# Patient Record
Sex: MALE | Race: WHITE
[De-identification: names, ages, dates, MRNs, and addresses within clinical notes are randomized per-mention and may not be internally consistent; named-entity substitution may affect disease eponyms.]

---

## 2017-01-04 ENCOUNTER — HOSPITAL ENCOUNTER (OUTPATIENT)
Dept: HOSPITAL 45 - C.OPENMRI | Age: 62
Discharge: HOME | End: 2017-01-04
Attending: ORTHOPAEDIC SURGERY
Payer: COMMERCIAL

## 2017-01-04 DIAGNOSIS — M54.16: Primary | ICD-10-CM

## 2017-01-04 DIAGNOSIS — Z96.651: ICD-10-CM

## 2017-01-04 DIAGNOSIS — M48.06: ICD-10-CM

## 2017-01-04 DIAGNOSIS — M51.26: ICD-10-CM

## 2017-01-04 NOTE — DIAGNOSTIC IMAGING REPORT
LUMBAR SPINE MRI



HISTORY:      LUMBAGO, LUMBAR RADICULOPATHY, RT TKA

Right



TECHNIQUE: Multiplanar multisequence MRI of the lumbar spine was performed

without the use of contrast.



COMPARISON:  None.



FINDINGS: For the purpose of the report the L5-S1 disc space will be located on

axial image 27 of 30.

Straightening of the lumbar spine. The alignment is intact. No fracture or

subluxation. The visualized retroperitoneal soft tissues are unremarkable.

Moderate disc space narrowing at L3-L4. Severe disc space narrowing at L5-S1.

Mild disc space narrowing at L1-L2, L2-L3, and L4-L5. The conus terminates at

the T12-L1 disc space level.



L1-L2: No significant central canal or neural foraminal narrowing.



L2-L3: Small broad-based posterior disc bulge without significant central canal

or neural foraminal narrowing.



L3-L4: Small broad-based posterior disc bulge resulting in mild central canal

narrowing. No significant neural foraminal narrowing.



L4-L5: There is a 13 x 9 mm right paracentral focal disc protrusion resulting in

mild to moderate right-sided central canal narrowing. This compresses the

transiting right L5 nerve root. This also results in mild right-sided neural

foraminal narrowing.



L5-S1: No significant central canal or neural foraminal narrowing.



IMPRESSION:  



1. A 13 x 9 mm right paracentral focal disc protrusion resulting in mild to

moderate right-sided central canal narrowing. This compresses the transiting

right L5 nerve root.

2. Straightening of the lumbar spine.

3. Additional degenerative changes as described above 







Electronically signed by:  Lorenzo Small M.D.

1/4/2017 10:05 AM

## 2017-05-11 ENCOUNTER — HOSPITAL ENCOUNTER (EMERGENCY)
Dept: HOSPITAL 45 - C.EDC | Age: 62
Discharge: HOME | End: 2017-05-11
Payer: COMMERCIAL

## 2017-05-11 VITALS
BODY MASS INDEX: 21.5 KG/M2 | HEIGHT: 72.01 IN | WEIGHT: 158.73 LBS | HEIGHT: 72.01 IN | BODY MASS INDEX: 21.5 KG/M2 | WEIGHT: 158.73 LBS

## 2017-05-11 VITALS — HEART RATE: 106 BPM | DIASTOLIC BLOOD PRESSURE: 91 MMHG | OXYGEN SATURATION: 95 % | SYSTOLIC BLOOD PRESSURE: 131 MMHG

## 2017-05-11 VITALS — TEMPERATURE: 99.32 F

## 2017-05-11 DIAGNOSIS — M51.26: Primary | ICD-10-CM

## 2017-05-11 DIAGNOSIS — Z79.899: ICD-10-CM

## 2017-05-11 DIAGNOSIS — Z98.1: ICD-10-CM

## 2017-05-11 DIAGNOSIS — Z88.5: ICD-10-CM

## 2017-05-11 DIAGNOSIS — J44.9: ICD-10-CM

## 2017-05-11 DIAGNOSIS — F17.200: ICD-10-CM

## 2017-05-11 DIAGNOSIS — F31.9: ICD-10-CM

## 2017-05-11 DIAGNOSIS — Z85.51: ICD-10-CM

## 2017-05-11 DIAGNOSIS — Z90.49: ICD-10-CM

## 2017-05-11 LAB
ANION GAP SERPL CALC-SCNC: 6 MMOL/L (ref 3–11)
APPEARANCE UR: CLEAR
BASOPHILS # BLD: 0.02 K/UL (ref 0–0.2)
BASOPHILS NFR BLD: 0.3 %
BILIRUB UR-MCNC: (no result) MG/DL
BUN SERPL-MCNC: 20 MG/DL (ref 7–18)
BUN/CREAT SERPL: 23.2 (ref 10–20)
CALCIUM SERPL-MCNC: 9.3 MG/DL (ref 8.5–10.1)
CHLORIDE SERPL-SCNC: 110 MMOL/L (ref 98–107)
CO2 SERPL-SCNC: 27 MMOL/L (ref 21–32)
COLOR UR: (no result)
COMPLETE: YES
CREAT CL PREDICTED SERPL C-G-VRATE: 88.5 ML/MIN
CREAT SERPL-MCNC: 0.87 MG/DL (ref 0.6–1.4)
EOSINOPHIL NFR BLD AUTO: 220 K/UL (ref 130–400)
GLUCOSE SERPL-MCNC: 79 MG/DL (ref 70–99)
HCT VFR BLD CALC: 47.7 % (ref 42–52)
IG%: 0.3 %
IMM GRANULOCYTES NFR BLD AUTO: 34 %
LYMPHOCYTES # BLD: 2.41 K/UL (ref 1.2–3.4)
MANUAL MICROSCOPIC REQUIRED?: NO
MCH RBC QN AUTO: 31.3 PG (ref 25–34)
MCHC RBC AUTO-ENTMCNC: 33.8 G/DL (ref 32–36)
MCV RBC AUTO: 92.6 FL (ref 80–100)
MONOCYTES NFR BLD: 8.3 %
NEUTROPHILS # BLD AUTO: 1.1 %
NEUTROPHILS NFR BLD AUTO: 56 %
NITRITE UR QL STRIP: (no result)
PH UR STRIP: 5 [PH] (ref 4.5–7.5)
PMV BLD AUTO: 9.5 FL (ref 7.4–10.4)
POTASSIUM SERPL-SCNC: 3.9 MMOL/L (ref 3.5–5.1)
RBC # BLD AUTO: 5.15 M/UL (ref 4.7–6.1)
REVIEW REQ?: NO
SODIUM SERPL-SCNC: 143 MMOL/L (ref 136–145)
SP GR UR STRIP: 1.03 (ref 1–1.03)
URINE BILL WITH OR WITHOUT MIC: (no result)
UROBILINOGEN UR-MCNC: (no result) MG/DL
WBC # BLD AUTO: 7.09 K/UL (ref 4.8–10.8)
ZZUR CULT IF INDIC CLEAN CATCH: NO

## 2017-05-11 NOTE — DIAGNOSTIC IMAGING REPORT
LUMBAR SPINE MRI



HISTORY: MRI lumbar spine  Lumbar pain into R, unable to ambulate, fecal

incontinence



TECHNIQUE: Multiplanar multisequence MRI of the lumbar spine was performed

without the use of contrast.



COMPARISON:  None.



FINDINGS: For the purpose of the report the L5-S1 disc space will be located on

axial image 23 of 25.

Moderate to rather significant degenerative this changes throughout. Posterior

disc herniation L4-L5 base on sagittal images.



L1-L2: No significant central canal or neural foraminal narrowing.



L2-L3: Minimal disc bulge. No significant impact with thecal sac.



L3-L4: Mild broad-based disc bulge. Minimal impact anterior thecal sac.



L4-L5: Right posterior disc herniation. This displaces the thecal sac to the

left and considerably narrows the origin of the right neuroforamina. Maximum

dimension the extruded disc is 12 x 10 mm.



L5-S1: No significant central canal or neural foraminal narrowing.



IMPRESSION:  



1. Significant right posterior disc herniation L4-L5

2. This creates narrowing of the right neuroforamina with a significant impact

upon the right lateral aspect of the thecal sac.

3. Several slight broad-based disc bulges.





4. Considerable degenerative disc changes throughout 







Electronically signed by:  Espinoza Fish M.D.

5/11/2017 4:38 PM



Dictated Date/Time:  5/11/2017 4:34 PM

## 2017-05-11 NOTE — EMERGENCY ROOM VISIT NOTE
ED Visit Note


First contact with patient:  13:41


I have personally evaluated this patient examined her and reviewed the 

pertinent labs and data. I have discussed the case with Marquez Amanda,The 

physician assistant and agree with the plan. Please refer to the PA note





This patient comes in with severe back pain.  He feels better after receiving 

IV pain medications.  We did an MRI and he does have a posterior disc bulge 

with right foraminal narrowing.  On exam, it does hurt when I move his leg.  He 

has nothing to suggest cauda equina syndrome.  We have discussed the case with 

Dr. Martinez, who feels that he follow-up with the patient the office on Monday.

  The patient feels better and does desire to go home.  The patient will be 

discharged home with pain medications.

## 2017-05-11 NOTE — EMERGENCY ROOM VISIT NOTE
History


First contact with patient:  13:41


Chief Complaint:  BACK PAIN


Stated Complaint:  BACK PAIN





History of Present Illness


The patient is a 61 year old male who presents to the Emergency Room via 

ambulance with complaints of "back pain".  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 

the coal mines, and has a history of spinal problems.  He states that he had a 

laminectomy of the cervical spine, had a knee replacement in July 2017.  This 

was performed by Dr. Mckeon.  He notes that roughly 2 months ago, he started 

with low back pain radiated into his right leg.  He states that he had an MRI 

performed at that time, and was referred to an orthopedic surgeon but is unsure 

who.  He has not seen them at this time.  He notes that since the MRI even 

doing well up until 3 days ago, when the pain has worsened 29/10 down his right 

leg.  He states that he has an extreme difficulty with cannulating, and is 

unable to bear weight secondary to pain on the right leg.  There is been no 

trauma or recent injury.  He denies any numbness or tingling in genital region, 

but does note near fecal incontinence, as well as trouble urinating.  He does 

have a history of bladder cancer 2.  He denies any fevers, chills, chest pain, 

shortness of breath, nausea, vomiting, urinary symptoms other than inability to 

void for blood in the stool or urine.  He said that compared to the pain he had 

when he had the lumbar spine MRI this is 10 times worse.  He was able to 

ambulate from his house to the ambulate, he had to be escorted on a stretcher.





Review of Systems


A complete 10-point Review of Systems was discussed with the patient, with 

pertinent positives and negatives listed in the History of Present Illness. All 

remaining Review of Systems questions can be considered negative unless 

otherwise specified.





Past Medical/Surgical History


Medical Problems:


(1) Bipolar I disorder


(2) Chronic obstructive lung disease


(3) Decompression/fusion cervical spine


(4) Decompression/fusion lumbar spine


(5) History of bladder neoplasm


(6) History of cholecystectomy








Social History


Smoking Status:  Current Every Day Smoker


Alcohol Use:  none


Drug Use:  none


Marital Status:  


Housing Status:  lives alone


Occupation Status:  employed





Current/Historical Medications


Scheduled


Finasteride (Proscar), 5 MG PO QPM


Methylprednisolone (Medrol Dosepak), 0 PO DAILY


Quetiapine Fumarate (Quetiapine Fumarate), 800 MG PO HS





Scheduled PRN


Albuterol Hfa (Ventolin Hfa), 2-4 PUFFS INH Q6H PRN for SOB/Wheezing


Methocarbamol (Robaxin), 1,000 MG PO QID PRN for Pain


Oxycodone Ir (Roxicodone Ir), 1-2 TAB PO Q4H PRN for Pain





Allergies


Coded Allergies:  


     Oxycodone (Verified  Adverse Reaction, Intermediate, NAUSEA/VOMITING, 10/12

/15)





Physical Exam


Vital Signs











  Date Time  Temp Pulse Resp B/P Pulse Ox O2 Delivery O2 Flow Rate FiO2


 


5/11/17 20:23  106 20 131/91 95   


 


5/11/17 19:45  101 20 128/79 95 Room Air  


 


5/11/17 17:00  82 20 126/83 94 Room Air  


 


5/11/17 15:45  79 20 118/74 95 Room Air  


 


5/11/17 13:23 37.4 80 20 125/77 95 Room Air  











Physical Exam


VITAL SIGNS - Vital signs and nursing notes were reviewed.  Patient is afebrile

, normotensive at 125/77, non-tachycardic and is saturating well on room air 95%

.


GENERAL -61-year-old male appearing his stated age who is in no acute distress 

but is writhing in pain, . Communicates well with provider and answers 

questions appropriately.


SKIN - Without rashes.


HEAD - NC/AT.


EYES -Sclera anicteric. Palpebral conjunctiva pink and moist with no injection 

noted.


EARS - No deformities of external structures noted on gross examination 

bilaterally. 


NOSE - Midline and without cyanosis. No epistaxis or purulent drainage noted. 


MOUTH/OROPHARYNX - Without perioral cyanosis. Buccal mucosa pink and moist and 

without leukoplakia.


NECK - Neck with FROM. Supple to palpation.  No meningismus.


LUNGS - Chest wall symmetric without accessory muscle use, intercostals 

retractions, or central cyanosis. Normal vesicular breath sounds CTA B/L. No 

wheezes, rales, or rhonchi appreciated.


CARDIAC - RRR with S1/S2. No murmur, rubs, or gallops appreciated. 


ABDOMEN - Abdominal contour  without pulsations or visible masses. BS 

normoactive all four quadrants. No tenderness, palpable masses, 

hepatosplenomegaly, or ascites noted.


EXTREMITIES - No clubbing or peripheral cyanosis. No pretibial edema present.  

He is vascularly intact in the lower extremities.  He does have exquisite 

tenderness upon range of motion of the right lower extremity.  There is 

tenderness of the lumbar spinous processes radiating to the rectal region and 

down the right leg.


NEUROLOGIC - Cranial nerves II through XII grossly intact.


PSYCH - Pt is very pleasant and interacts well with examiner.





Medical Decision & Procedures


ER Provider


Diagnostic Interpretation:


LUMBAR SPINE MRI





HISTORY: MRI lumbar spine  Lumbar pain into R, unable to ambulate, fecal


incontinence





TECHNIQUE: Multiplanar multisequence MRI of the lumbar spine was performed


without the use of contrast.





COMPARISON:  None.





FINDINGS: For the purpose of the report the L5-S1 disc space will be located on


axial image 23 of 25.


Moderate to rather significant degenerative this changes throughout. Posterior


disc herniation L4-L5 base on sagittal images.





L1-L2: No significant central canal or neural foraminal narrowing.





L2-L3: Minimal disc bulge. No significant impact with thecal sac.





L3-L4: Mild broad-based disc bulge. Minimal impact anterior thecal sac.





L4-L5: Right posterior disc herniation. This displaces the thecal sac to the


left and considerably narrows the origin of the right neuroforamina. Maximum


dimension the extruded disc is 12 x 10 mm.





L5-S1: No significant central canal or neural foraminal narrowing.





IMPRESSION:  





1. Significant right posterior disc herniation L4-L5


2. This creates narrowing of the right neuroforamina with a significant impact


upon the right lateral aspect of the thecal sac.


3. Several slight broad-based disc bulges.








4. Considerable degenerative disc changes throughout 











Electronically signed by:  Espinoza Fish M.D.


5/11/2017 4:38 PM





Dictated Date/Time:  5/11/2017 4:34 PM





Laboratory Results


5/11/17 14:16








Red Blood Count 5.15, Mean Corpuscular Volume 92.6, Mean Corpuscular Hemoglobin 

31.3, Mean Corpuscular Hemoglobin Concent 33.8, Mean Platelet Volume 9.5, 

Neutrophils (%) (Auto) 56.0, Lymphocytes (%) (Auto) 34.0, Monocytes (%) (Auto) 

8.3, Eosinophils (%) (Auto) 1.1, Basophils (%) (Auto) 0.3, Neutrophils # (Auto) 

3.97, Lymphocytes # (Auto) 2.41, Monocytes # (Auto) 0.59, Eosinophils # (Auto) 

0.08, Basophils # (Auto) 0.02





5/11/17 14:16

















Test


  5/11/17


14:16 5/11/17


18:24


 


White Blood Count


  7.09 K/uL


(4.8-10.8) 


 


 


Red Blood Count


  5.15 M/uL


(4.7-6.1) 


 


 


Hemoglobin


  16.1 g/dL


(14.0-18.0) 


 


 


Hematocrit 47.7 % (42-52)  


 


Mean Corpuscular Volume


  92.6 fL


() 


 


 


Mean Corpuscular Hemoglobin


  31.3 pg


(25-34) 


 


 


Mean Corpuscular Hemoglobin


Concent 33.8 g/dl


(32-36) 


 


 


Platelet Count


  220 K/uL


(130-400) 


 


 


Mean Platelet Volume


  9.5 fL


(7.4-10.4) 


 


 


Neutrophils (%) (Auto) 56.0 %  


 


Lymphocytes (%) (Auto) 34.0 %  


 


Monocytes (%) (Auto) 8.3 %  


 


Eosinophils (%) (Auto) 1.1 %  


 


Basophils (%) (Auto) 0.3 %  


 


Neutrophils # (Auto)


  3.97 K/uL


(1.4-6.5) 


 


 


Lymphocytes # (Auto)


  2.41 K/uL


(1.2-3.4) 


 


 


Monocytes # (Auto)


  0.59 K/uL


(0.11-0.59) 


 


 


Eosinophils # (Auto)


  0.08 K/uL


(0-0.5) 


 


 


Basophils # (Auto)


  0.02 K/uL


(0-0.2) 


 


 


RDW Standard Deviation


  42.5 fL


(36.4-46.3) 


 


 


RDW Coefficient of Variation


  12.6 %


(11.5-14.5) 


 


 


Immature Granulocyte % (Auto) 0.3 %  


 


Immature Granulocyte # (Auto)


  0.02 K/uL


(0.00-0.02) 


 


 


Anion Gap


  6.0 mmol/L


(3-11) 


 


 


Est Creatinine Clear Calc


Drug Dose 88.5 ml/min 


  


 


 


Estimated GFR (


American) 106.5 


  


 


 


Estimated GFR (Non-


American 91.9 


  


 


 


BUN/Creatinine Ratio 23.2 (10-20)  


 


Calcium Level


  9.3 mg/dl


(8.5-10.1) 


 


 


Urine Color  DK YELLOW 


 


Urine Appearance  CLEAR (CLEAR) 


 


Urine pH  5.0 (4.5-7.5) 


 


Urine Specific Gravity


  


  1.033


(1.000-1.030)


 


Urine Protein  NEG (NEG) 


 


Urine Glucose (UA)  NEG (NEG) 


 


Urine Ketones  1+ (NEG) 


 


Urine Occult Blood  NEG (NEG) 


 


Urine Nitrite  NEG (NEG) 


 


Urine Bilirubin  NEG (NEG) 


 


Urine Urobilinogen  NEG (NEG) 


 


Urine Leukocyte Esterase  NEG (NEG) 











Medications Administered











 Medications


  (Trade)  Dose


 Ordered  Sig/Clarice


 Route  Start Time


 Stop Time Status Last Admin


Dose Admin


 


 Morphine Sulfate


  (MoRPHine


 SULFATE INJ)  4 mg  NOW  STAT


 IV  5/11/17 13:57


 5/11/17 14:00 DC 5/11/17 14:26


4 MG


 


 Ondansetron HCl


  (Zofran Inj)  4 mg  NOW  STAT


 IV  5/11/17 13:57


 5/11/17 14:00 DC 5/11/17 14:25


4 MG


 


 Ketorolac


 Tromethamine


  (Toradol Inj)  30 mg  NOW  STAT


 IV  5/11/17 13:57


 5/11/17 14:00 DC 5/11/17 14:26


30 MG


 


 Dexamethasone


 Sodium Phosphate


  (Decadron Inj)  10 mg  NOW  STAT


 IV  5/11/17 13:57


 5/11/17 14:00 DC 5/11/17 14:25


10 MG


 


 Lorazepam


  (Ativan Inj)  1 mg  NOW  STAT


 IV  5/11/17 15:46


 5/11/17 15:47 DC 5/11/17 15:55


1 MG


 


 Morphine Sulfate


  (MoRPHine


 SULFATE INJ)  4 mg  NOW  STAT


 IV  5/11/17 17:15


 5/11/17 17:17 DC 5/11/17 17:51


4 MG


 


 Oxycodone HCl


  (Roxicodone


 Immediate Rel 5MG


 Home Pack)  1 homepack  UD  STAT


 PO  5/11/17 19:54


 5/11/17 19:55 DC 5/11/17 20:00


1 HOMEPACK











Medical Decision


Patient was seen and evaluated as above.  After obtaining a thorough history 

and physical examination IV access was previously established in route via ALS, 

he is provided 100 g of fentanyl.  He notes this help with the pain slightly.  

Basic labs were drawn, and at this time because of the patient's worsening 

symptoms, and near fecal incontinence, to the point where the patient states 

that he is unable to control his bowels I do believe that a repeat MRI is 

warranted.  Patient is not able to ambulate and presents via ambulance.  For 

pain he was provided with 4 mg of morphine, 4 mg of Zofran, 30 mg of Toradol, 

10 mg of Decadron all intravenously for his symptoms today.  He is reevaluated 

and was feeling better.  I informed him that because of his subjective and 

objective examination findings of the believe MRI is warranted of the lumbar 

spine.  UA clean catch was also ordered.  He was premedicated with 1 mg of 

Ativan intravenously prior to MRI as he notes that he had an open MRI before.  

He was given 4 mg of morphine and noted that he was feeling better and was 

actually able to ambulate.  That around 5:40 PM I spoke with Dr. Phillips, of 

[facility], and we discussed the MRI results and the patient's 

case.  He    informed me that he would be happy to see the patient, at 845 AM 

on Monday in his office.  This was at the patient was able to be discharged, 

and if so he was to be given a Medrol Dosepak at home.  If he was unable to be 

discharged secondary to his pain I was to call him back.  I then talk with the 

patient about options of staying versus going home and the patient then had an 

ambulatory trial, and past and decided he would like to go home.  I do believe 

this is reasonable.  The patient was provided with a small prescription for 

OxyIR as well as a home pack and a Medrol Dosepak.  He was given the phone 

number to call Dr. Phillips's office to schedule follow-up.  He was educated 

upon management, was educated upon worrisome symptoms which to return, had 

questions prior to discharge and was ambulatory from the emergency Department 

without the need for assistance in good condition.  He did have a ride and did 

not drive home personally.





In the evaluation treatment this patient following differential diagnoses were 

entertained: Herniated disc, renal calculi, lumbar strain, among others.





[place] Drug Monitoring Program


Search Results:  patient reviewed within database, no issues identified





Impression





 Primary Impression:  


 Significant right posterior disc herniation L4-L5





Departure Information


Dispostion


Home / Self-Care





Condition


GOOD





Prescriptions





Oxycodone Ir (Roxicodone Ir) 5 Mg Tab


1-2 TAB PO Q4H Y for Pain, #21 TAB


   For Initial Treatment


   Prov: Marquez Amanda, PAOmeroC         5/11/17 


Methylprednisolone (MEDROL DOSEPAK) 4 Mg Earl


0 PO DAILY, #1 PKT


   Prov: VasiliyMarquez PA-C         5/11/17





Referrals


Shadia Duncan DO (PCP)








Pritesh Phillips M.D.





Patient Instructions


My [facility]





Additional Instructions





You have been treated in the Emergency Department for Back Pain. You have 

received pain medicine in the emergency department which impairs your ability 

to operate a vehicle. It is illegal for you to drive after receiving these 

medicines. 





You have been prescribed OXY IR to be used for pain control. This is a narcotic 

medication. You cannot drive or consume alcohol while on this medicine. This 

medicine should only be used for pain that cannot be controlled with over-the-

counter pain medicines.





You have been prescribed a Medrol Dosepak. Take this medication as prescribed. 

You should take the COMPLETE 6-day course of this medication. This is an anti-

inflammatory medicine that will help to minimize your symptoms.





For pain control, you can use the following over-the-counter medicines (if >13 yo):





- Regular strength (325mg/tab) Tylenol (acetaminophen) 2 tabs every 4-6 hours 

as needed. Do not exceed 12 tablets in a 24 hour period. Avoid taking more than 

3 grams (3000 mg) of Tylenol per day. This includes any other sources of 

acetaminophen you may take on a regular basis.





- Regular strength (200 mg/tab) Advil (ibuprofen) 1-2 tabs every 4-6 hours as 

needed. Do not exceed a dose of 3200 mg per day.





If this is an acute injury, ice can be applied to the area of pain for the 

first 3 days to help decrease pain and inflammation. After the first 3 days, a 

heating pad can be used over the area for continued soothing relief.





Your case was discussed with Dr. Phillips from [facility].  His 

office numbers listed above.  Please call him first thing tomorrow morning to 

address your appointment that Dr. Phillips would like to see you Monday morning 

at 845AM.  He personally told me that he would like to see you at that time.





Return to the Emergency Department if your current symptoms worsen despite 

treatment course outlined above, or if you develop any of the following symptoms

: intractable pain despite aforementioned treatment course, loss of control of 

your bowel or bladder, numbness or tingling in your groin, or development of a 

fever.





Please return to the emergency department with any new/concerning symptoms.

## 2017-05-24 ENCOUNTER — HOSPITAL ENCOUNTER (INPATIENT)
Dept: HOSPITAL 45 - C.EDA | Age: 62
LOS: 3 days | Discharge: HOME | DRG: 552 | End: 2017-05-27
Attending: HOSPITALIST | Admitting: HOSPITALIST
Payer: COMMERCIAL

## 2017-05-24 VITALS
BODY MASS INDEX: 21.62 KG/M2 | BODY MASS INDEX: 21.62 KG/M2 | HEIGHT: 72 IN | HEIGHT: 72 IN | WEIGHT: 159.61 LBS | WEIGHT: 159.61 LBS

## 2017-05-24 DIAGNOSIS — M51.16: Primary | ICD-10-CM

## 2017-05-24 DIAGNOSIS — Z51.81: ICD-10-CM

## 2017-05-24 DIAGNOSIS — F31.9: ICD-10-CM

## 2017-05-24 DIAGNOSIS — Z80.6: ICD-10-CM

## 2017-05-24 DIAGNOSIS — Z85.51: ICD-10-CM

## 2017-05-24 DIAGNOSIS — Z91.19: ICD-10-CM

## 2017-05-24 DIAGNOSIS — N40.0: ICD-10-CM

## 2017-05-24 DIAGNOSIS — J44.9: ICD-10-CM

## 2017-05-24 DIAGNOSIS — Z98.1: ICD-10-CM

## 2017-05-24 DIAGNOSIS — R45.1: ICD-10-CM

## 2017-05-24 DIAGNOSIS — Z81.8: ICD-10-CM

## 2017-05-24 DIAGNOSIS — Z79.899: ICD-10-CM

## 2017-05-24 DIAGNOSIS — M47.26: ICD-10-CM

## 2017-05-24 DIAGNOSIS — Z80.1: ICD-10-CM

## 2017-05-24 DIAGNOSIS — F17.210: ICD-10-CM

## 2017-05-24 LAB
ALBUMIN/GLOB SERPL: 1.1 {RATIO} (ref 0.9–2)
ALP SERPL-CCNC: 81 U/L (ref 45–117)
ALT SERPL-CCNC: 24 U/L (ref 12–78)
ANION GAP SERPL CALC-SCNC: 8 MMOL/L (ref 3–11)
AST SERPL-CCNC: 15 U/L (ref 15–37)
BUN SERPL-MCNC: 16 MG/DL (ref 7–18)
BUN/CREAT SERPL: 17.5 (ref 10–20)
CALCIUM SERPL-MCNC: 8.5 MG/DL (ref 8.5–10.1)
CHLORIDE SERPL-SCNC: 111 MMOL/L (ref 98–107)
CO2 SERPL-SCNC: 24 MMOL/L (ref 21–32)
CREAT CL PREDICTED SERPL C-G-VRATE: 83.3 ML/MIN
CREAT SERPL-MCNC: 0.93 MG/DL (ref 0.6–1.4)
EOSINOPHIL NFR BLD AUTO: 241 K/UL (ref 130–400)
GLOBULIN SER-MCNC: 3.2 GM/DL (ref 2.5–4)
GLUCOSE SERPL-MCNC: 90 MG/DL (ref 70–99)
HCT VFR BLD CALC: 43.7 % (ref 42–52)
MCH RBC QN AUTO: 31.6 PG (ref 25–34)
MCHC RBC AUTO-ENTMCNC: 34.1 G/DL (ref 32–36)
MCV RBC AUTO: 92.6 FL (ref 80–100)
PMV BLD AUTO: 9.9 FL (ref 7.4–10.4)
POTASSIUM SERPL-SCNC: 3.7 MMOL/L (ref 3.5–5.1)
RBC # BLD AUTO: 4.72 M/UL (ref 4.7–6.1)
SODIUM SERPL-SCNC: 143 MMOL/L (ref 136–145)
TSH SERPL-ACNC: 2.02 UIU/ML (ref 0.3–4.5)
WBC # BLD AUTO: 10.86 K/UL (ref 4.8–10.8)

## 2017-05-24 NOTE — HISTORY AND PHYSICAL
History & Physical


Date & Time of Service:


May 24, 2017 at 23:12


Chief Complaint:


Rt Leg & Back Pain


Primary Care Physician:


Shadia Duncan DO


History of Present Illness


Source:  patient


60 y/o M Hx Chronic back pain, COPD, Bipolar disease.  Pt has had severe lower 

back pain for several weeks.  He had visited his orthopedist and was scheduled 

for an appt following an MRI which he failed to attend.  The MRI completed on  revealed a significant L4-5 herniation.  He describes severe persistent pain

, RLE numbness and weakness and difficulty ambulating as a result.  


The pt threatened self harm if he is discharged from the ER stating to the ER 

attending that he would take all his Seroquel if he is sent home.  In 

retrospect he states that he was not literally wanting to hurt himself but 

simply wished to make clear that he could not tolerate the degree of pain.


The case was discussed with orthopedics who advised on admission for pain 

control and AM evaluation.





Past Medical/Surgical History


Medical Problems:


(1) Bipolar I disorder


Status: Chronic  





(2) Chronic obstructive lung disease


Status: Chronic  





(3) Decompression/fusion cervical spine


Status: Resolved  





(4) Decompression/fusion lumbar spine


Status: Resolved  





(5) History of bladder neoplasm


Status: Resolved  





(6) History of cholecystectomy


Status: Resolved  











Family History


Mother  owing to lung CA


Father  owing to leukemia





Social History


Smoking Status:  Current Every Day Smoker


Drug Use:  none


Marital Status:  


Housing status:  lives alone


Occupational Status:  employed





Immunizations


History of Influenza Vaccine:  Unknown


History of Tetanus Vaccine?:  Yes


Tetanus Immunization Date:  2007


History of Pneumococcal:  No


History of Hepatitis B Vaccine:  No





Allergies


Coded Allergies:  


     Oxycodone (Verified  Adverse Reaction, Intermediate, NAUSEA/VOMITING, )





Home Medications


Scheduled


Finasteride (Proscar), 5 MG PO QPM


Quetiapine Fumarate (Quetiapine Fumarate), 800 MG PO HS





Scheduled PRN


Albuterol Hfa (Ventolin Hfa), 2-4 PUFFS INH Q6H PRN for SOB/Wheezing





Review of Systems


Constitutional:  No chills, No fever, No sweats


Eyes:  No eye pain, No worsening of vision


ENT:  No hearing loss, No nasal symptoms, No unusual epistaxis


Respiratory:  No cough, No sputum, No wheezing


Cardiovascular:  No PND, No chest pain, No orthopnea


Abdomen:  No nausea, No pain, No vomiting


Musculoskeletal:  + joint pain, + muscle pain


Genitourinary - Male:  No dysuria, No hematuria


Neurologic:  + numbness/tingling, + paralysis, No memory loss, No weakness


Psychiatric:  + anhedonism, + anxiety, + depression symptoms, + problem reported

 (Suicidal ideation)


Endocrine:  No fatigue


Hematologic / Lymphatic:  No abnormal bleeding/bruising


Integumentary:  No rash


Allergic / Immunologic:  No environmental allergies





Physical Exam


Vital Signs











  Date Time  Temp Pulse Resp B/P Pulse Ox O2 Delivery O2 Flow Rate FiO2


 


17 22:25  79 14  96   


 


17 22:20  73 23  94   


 


17 22:15  76 13  95   


 


17 22:11  80      


 


17 22:10  78 25  97   


 


17 22:08    137/90    


 


17 21:06 37.3 80 18 120/64 96 Room Air  








General Appearance:  WD/WN, no apparent distress


Head:  normocephalic


Eyes:  normal inspection, PERRL, EOMI


ENT:  normal ENT inspection, pharynx normal


Neck:  supple, no JVD


Respiratory/Chest:  chest non-tender, no respiratory distress, no accessory 

muscle use, + wheezing


Cardiovascular:  regular rate, rhythm, no edema, no gallop, no JVD, no murmur, 

normal peripheral pulses


Abdomen/GI:  normal bowel sounds, non tender, soft


Extremities/Musculoskelatal:  normal inspection, no calf tenderness, normal 

capillary refill, no pedal edema


Neurologic/Psych:  CNs II-XII nml as tested, oriented x 3, + pertinent finding (

His RLE is numb distally and strength is reduced distally as compared to L - 

ROM is limited due to pain)


Skin:  normal color, warm/dry, no rash





Diagnostics


Laboratory Results





Results Past 24 Hours








Test


  17


22:10 17


22:25 Range/Units


 


 


White Blood Count 10.86  4.8-10.8  K/uL


 


Red Blood Count 4.72  4.7-6.1  M/uL


 


Hemoglobin 14.9  14.0-18.0  g/dL


 


Hematocrit 43.7  42-52  %


 


Mean Corpuscular Volume 92.6    fL


 


Mean Corpuscular Hemoglobin 31.6  25-34  pg


 


Mean Corpuscular Hemoglobin


Concent 34.1


  


  32-36  g/dl


 


 


RDW Standard Deviation 43.3  36.4-46.3  fL


 


RDW Coefficient of Variation 12.6  11.5-14.5  %


 


Platelet Count 241  130-400  K/uL


 


Mean Platelet Volume 9.9  7.4-10.4  fL


 


Sodium Level 143  136-145  mmol/L


 


Potassium Level 3.7  3.5-5.1  mmol/L


 


Chloride Level 111    mmol/L


 


Carbon Dioxide Level 24  21-32  mmol/L


 


Anion Gap 8.0  3-11  mmol/L


 


Blood Urea Nitrogen 16  7-18  mg/dl


 


Creatinine


  0.93


  


  0.60-1.40


mg/dl


 


Est Creatinine Clear Calc


Drug Dose 83.3


  


   ml/min


 


 


Estimated GFR (


American) 100.9


  


   


 


 


Estimated GFR (Non-


American 87.1


  


   


 


 


BUN/Creatinine Ratio 17.5  10-20  


 


Random Glucose 90  70-99  mg/dl


 


Calcium Level 8.5  8.5-10.1  mg/dl


 


Total Bilirubin 0.4  0.2-1  mg/dl


 


Aspartate Amino Transf


(AST/SGOT) 15


  


  15-37  U/L


 


 


Alanine Aminotransferase


(ALT/SGPT) 24


  


  12-78  U/L


 


 


Alkaline Phosphatase 81    U/L


 


Total Protein 6.7  6.4-8.2  gm/dl


 


Albumin 3.5  3.4-5.0  gm/dl


 


Globulin 3.2  2.5-4.0  gm/dl


 


Albumin/Globulin Ratio 1.1  0.9-2  


 


Thyroid Stimulating Hormone


(TSH) 2.020


  


  0.300-4.500


uIu/ml


 


Chemistry Specimen Hemolysis    


 


Ethyl Alcohol mg/dL  < 3.0 0-3  mg/dl











Diagnostic Radiology


Lumbar MRI


1. Significant right posterior disc herniation L4-L5


2. This creates narrowing of the right neuroforamina with a significant impact 

upon the right lateral aspect of the thecal sac.


3. Several slight broad-based disc bulges.


4. Considerable degenerative disc changes throughout





Impression


Assessment and Plan


60 y/o M Hx Chronic back pain, COPD, Bipolar disease.  Pt has had severe lower 

back pain for several weeks.  He had visited his orthopedist and was scheduled 

for an appt following an MRI which he failed to attend.  The MRI completed on  revealed a significant L4-5 herniation.  He describes severe persistent pain

, RLE numbness and weakness and difficulty ambulating as a result.  


The pt threatened self harm if he is discharged from the ER stating to the ER 

attending that he would take all his Seroquel if he is sent home.  In 

retrospect he states that he was not literally wanting to hurt himself but 

simply wished to make clear that he could not tolerate the degree of pain.


The case was discussed with orthopedics who advised on admission for pain 

control and AM evaluation.





1) Back pain - rest, IVF, pain control pending evaluation by orthopedics - may 

need intervention





2) Bipolar disease - suicidal threats - denies this was said seriously - does 

not appear particularly depressed and is calm and cooperative - would consider 

psych consult if he shows any signs of depression - cont Seroquel as scheduled





3) COPD - some wheezing is present on exam - will cont his albuterol - received 

a dose of Decadron in ER for his back pain.





4) Smoking cessation advice provided - he is down to 2 cigarettes daily.











Full code - Heparin prophylaxis 


Total time for this admit including review of labs, meds, imaging, records - 

discussion with pt and ER attending - 34 min





Level of Care


Med/Surg





Resuscitation Status


FULL RESUSCITATION





VTE Prophylaxis


Given or contraindicated:  Unfractionated heparin SQ

## 2017-05-24 NOTE — EMERGENCY ROOM VISIT NOTE
History


Report prepared by Eliud:  Timothy Ross


Under the Supervision of:  Dr. Gregory Child M.D.


First contact with patient:  21:32


Chief Complaint:  BACK PAIN


Stated Complaint:  RT LEG & BACK PAIN





History of Present Illness


The patient is a 61 year old male who presents to the Emergency Room via 

ambulance with complaints of constant low back pain for the past five months. 

The patient states that he additionally has pain in his right leg and both legs 

while standing. He does not have a fever, and he states that he has not eaten 

today. The patient states that he cannot live with this pain anymore, and 

stated that he is going to take all of his Seroquel. The patient states that he 

was supposed to have back surgery five days ago, however he could not get a 

ride to get blood work so it was cancelled. The patient states that he is not 

taking anything for the pain, and he ran out of Vicodin. He additionally states 

that he had bladder cancer twice, and he has COPD. The patient states that he 

has tried prednisone in the past, though it does not help with the pain.





   Source of History:  patient


   Onset:  five months ago


   Position:  back (lower)


   Timing:  constant


   Modifying Factors (Worsening):  other (standing)


   Associated Symptoms:  No fevers


Note:


Associated symptoms: Leg pain





Review of Systems


See HPI for pertinent positives & negatives. A total of 10 systems reviewed and 

were otherwise negative.





Past Medical & Surgical


Medical Problems:


(1) Back pain


(2) Bipolar I disorder


(3) Chronic obstructive lung disease


(4) Decompression/fusion cervical spine


(5) Decompression/fusion lumbar spine


(6) Herniation of intervertebral disc between L4 and L5


(7) History of bladder neoplasm


(8) History of cholecystectomy








Social History


Smoking Status:  Current Every Day Smoker


Alcohol Use:  none


Drug Use:  none


Marital Status:  


Housing Status:  lives alone


Occupation Status:  employed





Current/Historical Medications


Scheduled


Finasteride (Proscar), 5 MG PO QPM


Quetiapine Fumarate (Quetiapine Fumarate), 800 MG PO HS





Scheduled PRN


Albuterol Hfa (Ventolin Hfa), 2-4 PUFFS INH Q6H PRN for SOB/Wheezing





Allergies


Coded Allergies:  


     Oxycodone (Verified  Adverse Reaction, Intermediate, NAUSEA/VOMITING, 5/24/ 17)





Physical Exam


Vital Signs











  Date Time  Temp Pulse Resp B/P Pulse Ox O2 Delivery O2 Flow Rate FiO2


 


5/24/17 22:25  79 14  96   


 


5/24/17 22:20  73 23  94   


 


5/24/17 22:15  76 13  95   


 


5/24/17 22:11  80      


 


5/24/17 22:10  78 25  97   


 


5/24/17 22:08    137/90    


 


5/24/17 21:06 37.3 80 18 120/64 96 Room Air  











Physical Exam


GENERAL: Patient is in moderate distress distress secondary to pain.


HEENT: No acute trauma, normocephalic atraumatic, mucous membranes dry, no 

nasal congestion, no scleral icterus.


NECK: No stridor, no adenopathy, no meningismus, trachea is midline.


LUNGS: Wheezing bilaterally, breath sounds equal, no respiratory distress.


HEART: Without murmurs gallops or rubs, regular rate and rhythm.


ABDOMEN: Soft, nontender, bowel sounds positive, no hernias, no peritonitis.


EXTREMITIES: Wasting of the right thigh musculature when compared to the left. 

No cyanosis or edema, full range of motion of all the joints without pain or 

difficulty, no signs for acute trauma.


NEUROLOGIC: 3/4 left patellar reflex. 0/4 right patellar reflex. Oriented x 3. 

Awake


SKIN: No rash, no jaundice, no diaphoresis.





Medical Decision & Procedures


Laboratory Results


5/24/17 22:10








5/24/17 22:10

















Test


  5/24/17


22:10 5/24/17


22:25


 


Red Blood Count


  4.72 M/uL


(4.7-6.1) 


 


 


Mean Corpuscular Volume


  92.6 fL


() 


 


 


Mean Corpuscular Hemoglobin


  31.6 pg


(25-34) 


 


 


Mean Corpuscular Hemoglobin


Concent 34.1 g/dl


(32-36) 


 


 


RDW Standard Deviation


  43.3 fL


(36.4-46.3) 


 


 


RDW Coefficient of Variation


  12.6 %


(11.5-14.5) 


 


 


Mean Platelet Volume


  9.9 fL


(7.4-10.4) 


 


 


Anion Gap


  8.0 mmol/L


(3-11) 


 


 


Est Creatinine Clear Calc


Drug Dose 83.3 ml/min 


  


 


 


Estimated GFR (


American) 100.9 


  


 


 


Estimated GFR (Non-


American 87.1 


  


 


 


BUN/Creatinine Ratio 17.5 (10-20)  


 


Calcium Level


  8.5 mg/dl


(8.5-10.1) 


 


 


Total Bilirubin


  0.4 mg/dl


(0.2-1) 


 


 


Aspartate Amino Transf


(AST/SGOT) 15 U/L (15-37) 


  


 


 


Alanine Aminotransferase


(ALT/SGPT) 24 U/L (12-78) 


  


 


 


Alkaline Phosphatase


  81 U/L


() 


 


 


Total Protein


  6.7 gm/dl


(6.4-8.2) 


 


 


Albumin


  3.5 gm/dl


(3.4-5.0) 


 


 


Globulin


  3.2 gm/dl


(2.5-4.0) 


 


 


Albumin/Globulin Ratio 1.1 (0.9-2)  


 


Thyroid Stimulating Hormone


(TSH) 2.020 uIu/ml


(0.300-4.500) 


 


 


Chemistry Specimen Hemolysis   


 


Ethyl Alcohol mg/dL


  


  < 3.0 mg/dl


(0-3)








Laboratory results reviewed by me.





Medications Administered











 Medications


  (Trade)  Dose


 Ordered  Sig/Clarice


 Route  Start Time


 Stop Time Status Last Admin


Dose Admin


 


 Hydromorphone HCl


  (Dilaudid Inj)  1 mg  Q30M  PRN


 IV  5/24/17 21:45


 5/25/17 01:09 DC 5/24/17 22:00


1 MG


 


 Ketorolac


 Tromethamine


  (Toradol Inj)  30 mg  NOW  STAT


 IV  5/24/17 21:36


 5/24/17 21:40 DC 5/24/17 22:00


30 MG


 


 Ondansetron HCl 4


 mg  4 mg  NOW  STAT


 IV  5/24/17 21:36


 5/24/17 21:40 DC 5/24/17 21:59


4 MG


 


 Sodium Chloride  500 ml @ 


 999 mls/hr  Q31M STAT


 IV  5/24/17 21:36


 5/24/17 22:06 DC 5/24/17 21:36


999 MLS/HR


 


 Sodium Chloride


  (Nss 1000ml)  1,000 ml @ 


 200 mls/hr  Q5H STAT


 IV  5/24/17 21:36


 5/25/17 01:09 DC 5/24/17 21:36


200 MLS/HR


 


 Dexamethasone


 Sodium Phosphate


  (Decadron Inj)  10 mg  NOW  ONCE


 IV  5/24/17 21:45


 5/24/17 21:46 DC 5/24/17 21:59


10 MG











ED Course


2123: The patient was evaluated in room A11. A complete history and physical 

exam was performed.





2136: Sodium Chloride 1000 ml @ 200 mls/hr IV, Sodium Chloride 500 ml @ 999 mls/

hr IV, Zofran Inj 4mg IV, Toradol Inj 30mg IV





2145: Decadron Inj 10mg IV, Dilaudid Inj 1mg IV





2201: I discussed the patient's case with Yakov Luu PA-C, Orthopedic 

surgery, and he states that no physician will take the patient for admission 

because he is currently the only one on call.





2210: I discussed the patient's case with [facility]. He is going to evaluate the patient for further treatment





Medical Decision


The patient is a 61 year old male who presents to the ED with complaints of 

lower back pain.  Differential diagnoses considered include lumbar disc 

herniation, failed outpatient treatment, suicidality, dehydration, electrolyte 

imbalance.





There is a very mild leukocytosis, likely consistent with his pain, no anemia.  

No significant electrolyte abnormality, kidney failure or hepatitis.





I was able to review the MRI result from earlier this month, the patient does 

have a large right sided lumbar disc herniation which I do think explains his 

pain and symptoms.  On exam, he has no reflex of the right patella, he has 

wasting of his right quadriceps muscle.





The patient received IV Toradol, IV Decadron, IV Zofran and IV Dilaudid.  He is 

more comfortable.





Admission/observation is warranted.  The patient does appear to require 

surgical intervention.  His pain is not controllable at home.  I spoke to 

spinal surgery, I spoke to the on-call hospitalist.  Case management has been 

involved.





Consults


Time Called:  2157


Consulting Physician:  Yakov Luu PA-C, Orthopedic Surgery


Returned Call:  2201


I discussed the patient's case with Yakov Luu PA-C, Orthopedic surgery, 

and he states that no physician will take him for admission because he is the 

only one on call.


Additional Consults:  


   Time Called:  2207


   Consulted Physician:  [facility]


   Returned Call:  2210


Additional Comments:


I discussed the patient's case with [facility]. 

He is going to evaluate the patient for further treatment





Impression





 Primary Impression:  


 Herniation of right side of L4-L5 intervertebral disc


 Additional Impression:  


 Failure of outpatient treatment





Scribe Attestation


The scribe's documentation has been prepared under my direction and personally 

reviewed by me in its entirety. I confirm that the note above accurately 

reflects all work, treatment, procedures, and medical decision making performed 

by me.





Departure Information


Dispostion


Being Evaluated By Hospitalist





Referrals


Shadia Duncan DO (PCP)





Patient Instructions


My [facility]





Problem Qualifiers

## 2017-05-25 VITALS
SYSTOLIC BLOOD PRESSURE: 133 MMHG | TEMPERATURE: 98.24 F | OXYGEN SATURATION: 95 % | HEART RATE: 81 BPM | DIASTOLIC BLOOD PRESSURE: 83 MMHG

## 2017-05-25 VITALS
DIASTOLIC BLOOD PRESSURE: 74 MMHG | OXYGEN SATURATION: 94 % | TEMPERATURE: 98.24 F | HEART RATE: 84 BPM | SYSTOLIC BLOOD PRESSURE: 121 MMHG

## 2017-05-25 VITALS
DIASTOLIC BLOOD PRESSURE: 79 MMHG | TEMPERATURE: 97.88 F | OXYGEN SATURATION: 94 % | SYSTOLIC BLOOD PRESSURE: 116 MMHG | HEART RATE: 83 BPM

## 2017-05-25 VITALS
SYSTOLIC BLOOD PRESSURE: 142 MMHG | HEART RATE: 77 BPM | DIASTOLIC BLOOD PRESSURE: 76 MMHG | OXYGEN SATURATION: 96 % | TEMPERATURE: 98.42 F

## 2017-05-25 LAB
INR PPP: 1 (ref 0.9–1.1)
PROTHROMBIN TIME: 10.4 SECONDS (ref 9–12)

## 2017-05-25 RX ADMIN — HYDROMORPHONE HYDROCHLORIDE PRN MG: 2 INJECTION, SOLUTION INTRAMUSCULAR; INTRAVENOUS; SUBCUTANEOUS at 16:24

## 2017-05-25 RX ADMIN — GABAPENTIN SCH MG: 300 CAPSULE ORAL at 21:28

## 2017-05-25 RX ADMIN — HYDROMORPHONE HYDROCHLORIDE PRN MG: 1 INJECTION, SOLUTION INTRAMUSCULAR; INTRAVENOUS; SUBCUTANEOUS at 07:43

## 2017-05-25 RX ADMIN — DEXAMETHASONE SODIUM PHOSPHATE SCH MLS/MIN: 4 INJECTION, SOLUTION INTRA-ARTICULAR; INTRALESIONAL; INTRAMUSCULAR; INTRAVENOUS; SOFT TISSUE at 22:27

## 2017-05-25 RX ADMIN — FINASTERIDE SCH MG: 5 TABLET, FILM COATED ORAL at 21:28

## 2017-05-25 RX ADMIN — QUETIAPINE SCH MG: 200 TABLET, FILM COATED ORAL at 21:28

## 2017-05-25 RX ADMIN — QUETIAPINE SCH MG: 200 TABLET, FILM COATED ORAL at 01:21

## 2017-05-25 RX ADMIN — GABAPENTIN SCH MG: 300 CAPSULE ORAL at 11:10

## 2017-05-25 RX ADMIN — NICOTINE POLACRILEX PRN PIECE: 2 GUM, CHEWING ORAL at 12:51

## 2017-05-25 RX ADMIN — DEXAMETHASONE SODIUM PHOSPHATE SCH MLS/MIN: 4 INJECTION, SOLUTION INTRA-ARTICULAR; INTRALESIONAL; INTRAMUSCULAR; INTRAVENOUS; SOFT TISSUE at 11:10

## 2017-05-25 RX ADMIN — HYDROMORPHONE HYDROCHLORIDE PRN MG: 1 INJECTION, SOLUTION INTRAMUSCULAR; INTRAVENOUS; SUBCUTANEOUS at 00:39

## 2017-05-25 RX ADMIN — DEXAMETHASONE SODIUM PHOSPHATE SCH MLS/MIN: 4 INJECTION, SOLUTION INTRA-ARTICULAR; INTRALESIONAL; INTRAMUSCULAR; INTRAVENOUS; SOFT TISSUE at 16:26

## 2017-05-25 RX ADMIN — HYDROMORPHONE HYDROCHLORIDE PRN MG: 2 INJECTION, SOLUTION INTRAMUSCULAR; INTRAVENOUS; SUBCUTANEOUS at 19:26

## 2017-05-25 RX ADMIN — HYDROMORPHONE HYDROCHLORIDE PRN MG: 2 INJECTION, SOLUTION INTRAMUSCULAR; INTRAVENOUS; SUBCUTANEOUS at 22:28

## 2017-05-25 RX ADMIN — HYDROMORPHONE HYDROCHLORIDE PRN MG: 1 INJECTION, SOLUTION INTRAMUSCULAR; INTRAVENOUS; SUBCUTANEOUS at 12:15

## 2017-05-25 NOTE — PAIN MANAGEMENT CONSULTATION
Pain Management Consultation


Date of Consultation


May 25, 2017.





Social / Work History


Marital Status:  


Housing Status:  lives alone


Occupation:  employed





Allergies


Coded Allergies:  


     Oxycodone (Verified  Adverse Reaction, Intermediate, NAUSEA/VOMITING, 5/24/ 17)





Medications





 Current Inpatient Medications








 Medications


  (Trade)  Dose


 Ordered  Sig/Clarice


 Route  Start Time


 Stop Time Status Last Admin


Dose Admin


 


 Albuterol


  (Ventolin Hfa


 Inhaler)  2 puffs  Q6H  PRN


 INH  5/24/17 23:30


 6/23/17 23:29   


 


 


 Finasteride


  (Proscar Tab)  5 mg  QPM


 PO  5/25/17 21:00


 6/24/17 20:59   


 


 


 Quetiapine


 Fumarate


  (seroQUEL TAB)  800 mg  HS


 PO  5/25/17 01:30


 6/24/17 01:29  5/25/17 01:21


800 MG


 


 Heparin Sodium


  (Porcine)


  (Heparin Sq 5000


 Unit/0.5ml)  5,000 unit  0000,0800,1600


 SQ  5/25/17 09:55


 6/24/17 09:54 Future Hold  


 


 


 Acetaminophen


  (Tylenol Tab)  650 mg  Q4H  PRN


 PO  5/24/17 23:30


 6/23/17 23:29   


 


 


 Al Hydrox/Mg


 Hydrox/Simethicone


  (Maalox Max Susp)  15 ml  Q4H  PRN


 PO  5/24/17 23:30


 6/23/17 23:29   


 


 


 Magnesium


 Hydroxide


  (Milk Of


 Magnesia Susp)  30 ml  Q6H  PRN


 PO  5/24/17 23:30


 6/23/17 23:29   


 


 


 Polyethylene


  (Miralax Powder


 Packet)  17 gm  DAILY  PRN


 PO  5/24/17 23:30


 6/23/17 23:29   


 


 


 Ondansetron HCl


  (Zofran Inj)  4 mg  Q6H  PRN


 IV  5/24/17 23:30


 6/23/17 23:29   


 


 


 Gabapentin 300 mg  300 mg  BID


 PO  5/25/17 10:30


 6/24/17 10:29  5/25/17 11:10


300 MG


 


 Dexamethasone


 Sodium Phosphate/


 Syringe


  (Decadron Inj/


 Syringe)  1 ml @ 1


 mls/min  Q6H


 IV  5/25/17 11:00


 6/24/17 10:59  5/25/17 16:26


1 MLS/MIN


 


 Pantoprazole


 Sodium


  (Protonix Tab)  40 mg  QAM


 PO  5/26/17 09:00


 6/25/17 08:59   


 


 


 Nicotine


 Polacrilex


  (Nicorette 2MG


 Gum)  1 piece  Q1H  PRN


 MT  5/25/17 10:45


 6/24/17 10:44  5/25/17 12:51


1 PIECE


 


 Hydromorphone HCl


  (Dilaudid Inj)  1.5 mg  Q3H  PRN


 IV  5/25/17 16:00


 6/8/17 15:59  5/25/17 16:24


1.5 MG











Physical Exam


Height & Weight:


Height  6 feet, 0.00 inches.    


Weight 72.400 (Kilograms) 159 (Pounds)





Last Vital Signs Documentation








  Date Time  Temp Pulse Resp B/P Pulse Ox O2 Delivery O2 Flow Rate FiO2


 


5/25/17 15:36 36.9 77 18 142/76 96 Room Air  











Laboratory


Laboratory Results (Last CBC):


5/24/17 22:10











Assessment


1.  Lumbar radiculitis


2.  []


3.  []


4.  []


5.  []





Recommendations


1.  Scheduled for lumbar transforaminal injection tomorrow


2.  []


3.  []


4.  []


5.  []





Dragon Voice Recognition


This chart was completed in part utilizing Dragon Dictation Voice Recognition 

Software. Random word insertions, pronoun errors, and incomplete sentences are 

an occasional consequence of this system due to software limitations and 

ambient noise. Any questions or concerns about the content, text or information 

contained within the body of this dictation should be directly addressed to the 

provider for clarification.

## 2017-05-25 NOTE — CONSULTATION REPORT
DATE OF CONSULTATION:  05/25/2017

 

DATE OF CONSULTATION:  05/25/2017.  

 

HISTORY OF PRESENT ILLNESS:  The patient presented to the Emergency Room last

evening for back and bilateral leg pain, right greater than left.  He is a

known patient of Dr. Phillips's.  He reports he is actually scheduled to have

a lumbar laminectomy by Dr. Phillips  last Friday in [place] but due to the

fact that the patient was unable to find a ride for PATs he never had these 

performed, therefore  surgery was canceled.  He reports he has had no

followup.  He reports he has had the ongoing complaints for about 3 months. 

Pain is most significant when he  stands or walks.  He states there is no

comfortable position.  He denies bowel or bladder dysfunction or loss of

control.  No other complaints.  He does review with me that he has had a poor

surgical outcome  with his right knee last year by Dr. Zheng as well as a

posterior cervical laminectomy many years ago which resulted in continued

issues with his left arm.

 

PAST MEDICAL HISTORY:  Significant for bipolar disorder, COPD.

 

PAST SURGICAL HISTORY:  Significant for bladder neoplasm, cholecystectomy,

posterior cervical decompression.

 

ALLERGIES:  OXYCODONE.

 

MEDICATIONS AT HOME:  Include Proscar and quetiapine fumarate.

 

SOCIAL HISTORY:  He smokes.  Alcohol he denies.  He is .

 

REVIEW OF SYSTEMS:  Significant for back and bilateral leg pain, right

greater than left.

 

PHYSICAL EXAMINATION:

GENERAL:  He is lying in bed, seen in room 377.  He is in no obvious

distress.  He is able to sit up on the edge of the bed without difficulty for

me.

EXTREMITIES:  Strength is intact bilateral lower extremities.  Calves are

soft and nontender bilaterally.

 

IMAGING:  He has an MRI from 05/11/2017 lumbar spine.  Sagittal views

demonstrate multilevel degenerative changes.  There is Modic endplate changes

at L4-L5.  There is a large right-sided disc herniation at L4-L5.  This

creates modest neural foraminal stenosis L4-L5 on the right.  Axillary views

demonstrate moderate central stenosis  L3-L4.  L4-L5 has a large right-sided

disc fragment.  There is modest facet hypertrophy at this level.  L5-S1 has

lateral recess stenosis, left appears worse than right.  

 

ASSESSMENT:  

1.  Multilevel degenerative changes.

2.  Herniated nucleus pulposus L4-L5 on the right.

 

PLAN:  I have reviewed current clinical course.  We have discussed options

including oral pain medications versus steroid injections versus planned

surgery lumbar laminectomy.  The patient has proven noncompliant in the past.

 He is currently uncooperative upon my discussion and reviewing everything

with him.  He only wants a fusion.  He is not interested in injections or 

lumbar laminectomy with Dr. Phillips or again oral pain medications. 

Therefore, have nothing to offer him.  He may follow up with Dr. Phillips as

needed.  I have offered him a follow-up appointment and again he has refused

this as well.

## 2017-05-25 NOTE — PROGRESS NOTE
Subjective


Date of Service:


May 25, 2017.


Subjective


Pt evaluation today including:  conversation w/ patient, physical exam, chart 

review, lab review, review of studies (MRI L-spine), conversation w/ consultant 

(psych, pain management), review of inpatient medication list


Pain:  low back radiating into right leg


PO Intake:  normal


Voiding:  no voiding problems


Saw patient twice today


First visit he was calm and pleasant. 


We discussed options for care - meds vs injections vs surgery.  


I explained that both pain management and surgery would be seeing him.


He denied that prednisone made his bipolar worse and reported no issues with 

mental health recently.


He denied suicidal thoughts.





A code grey was called later in the morning after becoming upset about NOT 

getting surgery on his back.


Staff called me and when I arrived he was extremely agitated/upset.


Security was still present.





We discussed that he could receive an epidural injection tomorrow and that we 

could keep him comfortable until that time.  


He agreed to stay hospitalized and "to cooperate."  


He was agreeable to psych consult.





Problem List


Medical Problems:


(1) Failure of outpatient treatment


Status: Acute  





(2) Herniation of right side of L4-L5 intervertebral disc


Status: Acute  











Review of Systems


Constitutional:  No fever


Respiratory:  + cough, + wheezing, No shortness of breath


Cardiac:  No chest pain


Abdomen:  No pain


Neurologic:  + numbness/tingling (right leg), + weakness (distal right leg)





Objective


Vital Signs











  Date Time  Temp Pulse Resp B/P Pulse Ox O2 Delivery O2 Flow Rate FiO2


 


5/25/17 16:30      Room Air  


 


5/25/17 15:36 36.9 77 18 142/76 96 Room Air  


 


5/25/17 07:50      Room Air  


 


5/25/17 07:36 36.6 83 15 116/79 94 Room Air  


 


5/25/17 00:30 36.8 81 20 133/83 95 Room Air  


 


5/25/17 00:30      Room Air  


 


5/25/17 00:30 36.8 81 20 133/83  Room Air  


 


5/25/17 00:01  80 18 136/79 95   


 


5/24/17 22:25  79 14  96   


 


5/24/17 22:20  73 23  94   


 


5/24/17 22:15  76 13  95   


 


5/24/17 22:11  80      


 


5/24/17 22:10  78 25  97   


 


5/24/17 22:08    137/90    











Physical Exam


General Appearance:  no apparent distress, + thin, + pertinent finding (

agitated )


ENT:  pharynx normal


Neck:  no JVD


Respiratory/Chest:  no respiratory distress, no accessory muscle use, + wheezing

 (diffuse )


Cardiovascular:  regular rate, rhythm, no gallop, no murmur


Abdomen:  normal bowel sounds, non tender, soft, no organomegaly


Extremities:  no pedal edema


Neurologic/Psychiatric:  alert, oriented x 3, + pertinent finding (agitated)


Comments:


neuro -


atrophy noted right thigh


hip flexion b/l 5/5


dorsiflexion right foot 4/5; left foot 5/5; plantarflexion 5/5 b/l


decreased ankle jerk on right, normal on left





Laboratory Results





Last 24 Hours








Test


  5/24/17


22:10 5/24/17


22:25 5/25/17


05:20


 


White Blood Count 10.86 K/uL   


 


Red Blood Count 4.72 M/uL   


 


Hemoglobin 14.9 g/dL   


 


Hematocrit 43.7 %   


 


Mean Corpuscular Volume 92.6 fL   


 


Mean Corpuscular Hemoglobin 31.6 pg   


 


Mean Corpuscular Hemoglobin


Concent 34.1 g/dl 


  


  


 


 


RDW Standard Deviation 43.3 fL   


 


RDW Coefficient of Variation 12.6 %   


 


Platelet Count 241 K/uL   


 


Mean Platelet Volume 9.9 fL   


 


Sodium Level 143 mmol/L   


 


Potassium Level 3.7 mmol/L   


 


Chloride Level 111 mmol/L   


 


Carbon Dioxide Level 24 mmol/L   


 


Anion Gap 8.0 mmol/L   


 


Blood Urea Nitrogen 16 mg/dl   


 


Creatinine 0.93 mg/dl   


 


Est Creatinine Clear Calc


Drug Dose 83.3 ml/min 


  


  


 


 


Estimated GFR (


American) 100.9 


  


  


 


 


Estimated GFR (Non-


American 87.1 


  


  


 


 


BUN/Creatinine Ratio 17.5   


 


Random Glucose 90 mg/dl   


 


Calcium Level 8.5 mg/dl   


 


Total Bilirubin 0.4 mg/dl   


 


Aspartate Amino Transf


(AST/SGOT) 15 U/L 


  


  


 


 


Alanine Aminotransferase


(ALT/SGPT) 24 U/L 


  


  


 


 


Alkaline Phosphatase 81 U/L   


 


Total Protein 6.7 gm/dl   


 


Albumin 3.5 gm/dl   


 


Globulin 3.2 gm/dl   


 


Albumin/Globulin Ratio 1.1   


 


Thyroid Stimulating Hormone


(TSH) 2.020 uIu/ml 


  


  


 


 


Chemistry Specimen Hemolysis    


 


Ethyl Alcohol mg/dL  < 3.0 mg/dl  


 


Prothrombin Time   10.4 SECONDS 


 


Prothromb Time International


Ratio 


  


  1.0 


 


 


Hepatitis C Antibody Screen   NEG 











Assessment and Plan


62yo male:





1.  L4-L5 herniated disc with right leg radiculopathy -


appreciate ortho and pain management consultations. 


epidural injection offered and will be given in the AM by pain management. 


in meantime start decadron 4mg q6h, gabapentin 300 BID for neuropathic pain, 

and dilaudid IV prn.  


if injections are not helpful and he fails to improve over time then surgery 

will be explored.  


PT, OT.  


hold heparin for injection tomorrow. 





2.  tobacco dependence - nicorette gum prn. 





3.  COPD - cont albuterol prn; needs to stop smoking. 





4.  bipolar d/o - cont seroquel.  


he has extreme agitation and aggressive personality. 


will ask psych to see for additional med recommendations.  





5.  DVT proph - hold heparin in light of injection tomorrow. 





6.  FEN - cont IVF, repeat labs in am. 





7.  BPH - finasteride.  





8.  GI proph due to steroids - PPI. 








time 40 minutes today


Continued [facility] stay due to:  inadequate oral pain control, ambulation 

difficulties, multiple IV medications needed


Discharge planning:  home

## 2017-05-26 VITALS
TEMPERATURE: 98.06 F | HEART RATE: 85 BPM | SYSTOLIC BLOOD PRESSURE: 132 MMHG | OXYGEN SATURATION: 92 % | DIASTOLIC BLOOD PRESSURE: 75 MMHG

## 2017-05-26 VITALS
DIASTOLIC BLOOD PRESSURE: 79 MMHG | SYSTOLIC BLOOD PRESSURE: 134 MMHG | HEART RATE: 80 BPM | TEMPERATURE: 98.24 F | OXYGEN SATURATION: 94 %

## 2017-05-26 VITALS
SYSTOLIC BLOOD PRESSURE: 148 MMHG | OXYGEN SATURATION: 96 % | DIASTOLIC BLOOD PRESSURE: 80 MMHG | HEART RATE: 77 BPM | TEMPERATURE: 98.06 F

## 2017-05-26 VITALS — SYSTOLIC BLOOD PRESSURE: 151 MMHG | OXYGEN SATURATION: 95 % | HEART RATE: 92 BPM | DIASTOLIC BLOOD PRESSURE: 86 MMHG

## 2017-05-26 VITALS — OXYGEN SATURATION: 94 %

## 2017-05-26 LAB
ANION GAP SERPL CALC-SCNC: 7 MMOL/L (ref 3–11)
BUN SERPL-MCNC: 13 MG/DL (ref 7–18)
BUN/CREAT SERPL: 17 (ref 10–20)
CALCIUM SERPL-MCNC: 9 MG/DL (ref 8.5–10.1)
CHLORIDE SERPL-SCNC: 108 MMOL/L (ref 98–107)
CO2 SERPL-SCNC: 26 MMOL/L (ref 21–32)
CREAT CL PREDICTED SERPL C-G-VRATE: 100.6 ML/MIN
CREAT SERPL-MCNC: 0.77 MG/DL (ref 0.6–1.4)
GLUCOSE SERPL-MCNC: 116 MG/DL (ref 70–99)
MAGNESIUM SERPL-MCNC: 2.3 MG/DL (ref 1.8–2.4)
POTASSIUM SERPL-SCNC: 4 MMOL/L (ref 3.5–5.1)
SODIUM SERPL-SCNC: 141 MMOL/L (ref 136–145)

## 2017-05-26 PROCEDURE — 3E0S3GC INTRODUCTION OF OTHER THERAPEUTIC SUBSTANCE INTO EPIDURAL SPACE, PERCUTANEOUS APPROACH: ICD-10-PCS | Performed by: ANESTHESIOLOGY

## 2017-05-26 RX ADMIN — GABAPENTIN SCH MG: 300 CAPSULE ORAL at 08:55

## 2017-05-26 RX ADMIN — HYDROMORPHONE HYDROCHLORIDE PRN MG: 2 INJECTION, SOLUTION INTRAMUSCULAR; INTRAVENOUS; SUBCUTANEOUS at 09:49

## 2017-05-26 RX ADMIN — NICOTINE POLACRILEX PRN PIECE: 2 GUM, CHEWING ORAL at 07:30

## 2017-05-26 RX ADMIN — HYDROMORPHONE HYDROCHLORIDE PRN MG: 2 INJECTION, SOLUTION INTRAMUSCULAR; INTRAVENOUS; SUBCUTANEOUS at 07:23

## 2017-05-26 RX ADMIN — ALBUTEROL SULFATE SCH PUFFS: 90 AEROSOL, METERED RESPIRATORY (INHALATION) at 23:54

## 2017-05-26 RX ADMIN — DEXAMETHASONE SODIUM PHOSPHATE SCH MLS/MIN: 4 INJECTION, SOLUTION INTRA-ARTICULAR; INTRALESIONAL; INTRAMUSCULAR; INTRAVENOUS; SOFT TISSUE at 04:43

## 2017-05-26 RX ADMIN — DEXAMETHASONE SODIUM PHOSPHATE SCH MLS/MIN: 4 INJECTION, SOLUTION INTRA-ARTICULAR; INTRALESIONAL; INTRAMUSCULAR; INTRAVENOUS; SOFT TISSUE at 10:58

## 2017-05-26 RX ADMIN — HYDROMORPHONE HYDROCHLORIDE PRN MG: 2 INJECTION, SOLUTION INTRAMUSCULAR; INTRAVENOUS; SUBCUTANEOUS at 12:08

## 2017-05-26 RX ADMIN — HYDROMORPHONE HYDROCHLORIDE PRN MG: 2 INJECTION, SOLUTION INTRAMUSCULAR; INTRAVENOUS; SUBCUTANEOUS at 14:04

## 2017-05-26 RX ADMIN — HYDROMORPHONE HYDROCHLORIDE PRN MG: 2 INJECTION, SOLUTION INTRAMUSCULAR; INTRAVENOUS; SUBCUTANEOUS at 16:07

## 2017-05-26 RX ADMIN — FINASTERIDE SCH MG: 5 TABLET, FILM COATED ORAL at 20:34

## 2017-05-26 RX ADMIN — GABAPENTIN SCH MG: 300 CAPSULE ORAL at 20:34

## 2017-05-26 RX ADMIN — QUETIAPINE SCH MG: 200 TABLET, FILM COATED ORAL at 20:34

## 2017-05-26 RX ADMIN — ALBUTEROL SULFATE SCH PUFFS: 90 AEROSOL, METERED RESPIRATORY (INHALATION) at 19:45

## 2017-05-26 RX ADMIN — HYDROCODONE BITARTRATE AND ACETAMINOPHEN PRN TAB: 10; 325 TABLET ORAL at 22:31

## 2017-05-26 RX ADMIN — PANTOPRAZOLE SCH MG: 40 TABLET, DELAYED RELEASE ORAL at 08:55

## 2017-05-26 RX ADMIN — HYDROMORPHONE HYDROCHLORIDE PRN MG: 2 INJECTION, SOLUTION INTRAMUSCULAR; INTRAVENOUS; SUBCUTANEOUS at 18:19

## 2017-05-26 RX ADMIN — DEXAMETHASONE SODIUM PHOSPHATE SCH MLS/MIN: 4 INJECTION, SOLUTION INTRA-ARTICULAR; INTRALESIONAL; INTRAMUSCULAR; INTRAVENOUS; SOFT TISSUE at 17:23

## 2017-05-26 RX ADMIN — HYDROMORPHONE HYDROCHLORIDE PRN MG: 2 INJECTION, SOLUTION INTRAMUSCULAR; INTRAVENOUS; SUBCUTANEOUS at 20:33

## 2017-05-26 RX ADMIN — HYDROMORPHONE HYDROCHLORIDE PRN MG: 2 INJECTION, SOLUTION INTRAMUSCULAR; INTRAVENOUS; SUBCUTANEOUS at 23:46

## 2017-05-26 NOTE — PAIN MANAGEMENT PROGRESS NOTE
Pain Management Progress Note


Date of Service


May 26, 2017.





Subjective


Mr. Langford continues experience his typical symptoms in the right lower 

extremity.  No new complaints reported.





Pain Location











1 - 








Objective


Vital Signs:





Last Vital Signs Documentation








  Date Time  Temp Pulse Resp B/P Pulse Ox O2 Delivery O2 Flow Rate FiO2


 


5/26/17 08:53  92 18 151/86 95 Room Air  


 


5/26/17 07:13 36.8       








Physical Exam:


A/O.  SLR positive  on the right.  Sensation and motor strength intact.





Laboratory


Laboratory Findings


5/24/17 22:10











Assessment


1.  Intervertebral disc disorder.  Lumbar radiculitis.





Recommendations


1.  Patient underwent right L4/L5 transforaminal epidural steroid injection 

this morning.


2.  May be discharged if otherwise stable and follow up in [facility] pain 

clinic has been arranged in 2 weeks.





Dragon Voice Recognition


This chart was completed in part utilizing Dragon Dictation Voice Recognition 

Software. Random word insertions, pronoun errors, and incomplete sentences are 

an occasional consequence of this system due to software limitations and 

ambient noise. Any questions or concerns about the content, text or information 

contained within the body of this dictation should be directly addressed to the 

provider for clarification.

## 2017-05-26 NOTE — PROGRESS NOTE
Subjective


Date of Service:


May 26, 2017.


Subjective


Pt evaluation today including:  conversation w/ patient, physical exam, chart 

review, lab review, review of studies (epidural injection note), review of 

inpatient medication list


Pain:  right leg - ongoing


PO Intake:  normal


Voiding:  no voiding problems


I saw the patient this afternoon - several hours after he had had his epidural 

injection. 


He states he had little relief of pain with the anesthetic in the injection. 


He continues to request dilaudid every 2 hours per the nursing record. 


Denies any new neurological symptoms.





Problem List


Medical Problems:


(1) Failure of outpatient treatment


Status: Acute  





(2) Herniation of right side of L4-L5 intervertebral disc


Status: Acute  











Review of Systems


Constitutional:  No fever


Respiratory:  + wheezing, No shortness of breath


Cardiac:  No chest pain


Abdomen:  + constipation, No pain





Objective


Vital Signs











  Date Time  Temp Pulse Resp B/P Pulse Ox O2 Delivery O2 Flow Rate FiO2


 


5/26/17 15:09 36.7 77 18 148/80 96 Room Air  


 


5/26/17 15:00      Room Air  


 


5/26/17 08:53  92 18 151/86 95 Room Air  


 


5/26/17 07:15     94 Room Air  


 


5/26/17 07:13 36.8 80 19 134/79 94 Room Air  


 


5/25/17 23:10      Room Air  


 


5/25/17 23:00 36.8 84 16 121/74 94 Room Air  











Physical Exam


General Appearance:  no apparent distress, + pertinent finding (looks fidgety/

agitated)


ENT:  pharynx normal


Neck:  no JVD


Respiratory/Chest:  no respiratory distress, no accessory muscle use, + wheezing


Cardiovascular:  regular rate, rhythm, no gallop, no murmur


Abdomen:  normal bowel sounds, non tender, soft, no organomegaly


Extremities:  no pedal edema


Neurologic/Psychiatric:  alert, oriented x 3, + pertinent finding (mild 

weakness with dorsiflexion of ankle on right only; all other muscle groups with 

normal strength b/l )





Laboratory Results





Last 24 Hours








Test


  5/26/17


06:54


 


Sodium Level 141 mmol/L 


 


Potassium Level 4.0 mmol/L 


 


Chloride Level 108 mmol/L 


 


Carbon Dioxide Level 26 mmol/L 


 


Anion Gap 7.0 mmol/L 


 


Blood Urea Nitrogen 13 mg/dl 


 


Creatinine 0.77 mg/dl 


 


Est Creatinine Clear Calc


Drug Dose 100.6 ml/min 


 


 


Estimated GFR (


American) 111.9 


 


 


Estimated GFR (Non-


American 96.6 


 


 


BUN/Creatinine Ratio 17.0 


 


Random Glucose 116 mg/dl 


 


Calcium Level 9.0 mg/dl 


 


Magnesium Level 2.3 mg/dl 











Assessment and Plan


62yo male:





1.  L4-L5 herniated disc with right leg radiculopathy -


appreciate ortho and pain management consultations. 


he is s/p epidural injection this AM by Dr. Mike.  


he was counseled that it may take several days for the steroid to take effect. 


wean decadron IV to q12h.


increase gabapentin to 300mg TID.


try to use norco in darlene of dilaudid IV.  


PT, OT to see if patient will be safe to return home. 





2.  tobacco dependence - nicorette gum prn. 





3.  COPD - cont albuterol q6h; needs to stop smoking. 





4.  bipolar d/o - cont seroquel.  


he has extreme agitation and aggressive personality. 


psych consult appreciated; no new recs at this time. 





5.  DVT proph - resume heparin tomorrow. 





6.  FEN - stable lytes, off fluids. 





7.  BPH - finasteride.  





8.  GI proph due to steroids - PPI. 





9.  bowel maintenance - miralax BID + senna daily. 








re-eval candidacy for d/c home tomorrow


Continued [facility] stay due to:  inadequate oral pain control, ambulation 

difficulties, multiple IV medications needed


Discharge planning:  home

## 2017-05-26 NOTE — PAIN CLINIC PROCEDURE NOTE
Pain Management Procedure Note


Date of Procedure


May 26, 2017.





Procedure Description


Procedure Time Out:  side/site verified, patient ID confirmed, correct procedure


Consent Obtained:  written


Performed By:  Dr. Mike


Indications:  therapeutic


Contraindications:  none


Pre Procedure Vital Signs











  Date Time  Temp Pulse Resp B/P Pulse Ox O2 Delivery O2 Flow Rate FiO2


 


5/26/17 08:53  92 18 151/86 95 Room Air  


 


5/26/17 07:13 36.8 80 19 134/79 94 Room Air  








ASA Class:  3


Description:


 LUMBAR TRANSFORAMINAL EPIDURAL STEROID INJECTION








Diagnosis: Lumbar radiculitis.  Intervertebral disc disorder.


Level injected: L4/L5, right side.


Surgeon: Dr. Mike





Prior to starting, the Patients diagnosis and the procedure were reviewed with 

the patient in detail.  Possible risks, complications and alternative therapies 

were also reviewed.  Patients questions were answered.  Informed consent was 

obtained.  Allergies and medication list was reviewed.





The patient was brought to the fluoroscopy room and placed in prone position on 

the table.  Immediately prior to starting the procedure, a time out was 

conducted with the staff and the patient where the patient was identified, 

proposed procedure was verified, consent was reviewed and the proper site for 

the planned procedure was identified.  Fluoroscopy was utilized in performing 

the procedure to assist the placement of the needle, to evaluate the final 

position of the needle prior to injection and to avoid intravascular injection.

  Monitors used included intermittent blood pressure with automated device, 

continuous pulse oximetry and level of consciousness.  Patient was not given 

any intravenous sedation and constant verbal contact was maintained throughout 

the procedure.  Biplanar fluoroscopy was used to assist in placement of the 

needle as well as to evaluate final needle position prior to the injection.





On examination, no signs of skin breakdown or infection were noted at the 

injection site.  Lumbar-sacral area was prepped with DuraPrep followed by 

Betadine solution.  Sterile drapes were applied. The appropriate interspace and 

disk was identified in a true AP view.  The fluoroscope was then rotated to 

obtain a decubitus view in such a manner so that the superior articular process 

of the inferior vertebra was bisecting the pars inter-articularis of the 

vertebra above in two or in the 6 oclock position.  A 22 Gauge 3.5 inch curved 

(15 degrees) spinal needle was inserted through the skin and subcutaneous 

tissues, after local anesthetic infiltration, and advanced in a co-axial 

technique.  Needle tip was first placed on the infero-lateral margin of the 

pars inter-articularis.  Once the bony margin was contacted, the C-arm was 

rotated to obtain a lateral view.  The needle was slowly walked off the bone 

and advanced toward the anterior and superior aspect of the foramen.  Patient 

did not experience any pain or paresthesia.  Six inch micro bore tubing was 

attached to the needle and aspiration did not demonstrate CSF or blood.  AP 

view was checked to ensure the needle tip was in close proximity to the nerve 

root in the proximal neural foramen lateral to the inferior articular process 

and in the 6 oclock position.  1 cc of Isovue 300 contrast was injected via 

the needle under live fluoroscopy.  Spread of the contrast was noted in the 

epidural space and along the nerve root.  Neither subdural or subarachnoid 

spread nor intravascular uptake was noted on plain fluoroscopy.  Approximately 

10 second digital subtraction angiogram at 8 f/s rate was done in AP view with 

additional contrast.  No vascular uptake was noted.  Next 80 mg of Kenalog was 

injected at each site followed by 2 cc of percent Xylocaine MPF to flush the 

needle.  The patient did not experience pain during the injection.  Adequate 

hemostasis was noted.  A sterile Band-Aid was applied to the injection site.





Patient was monitored for 30 minutes and discharged with an accompanying adult.

  Discharge instructions were reviewed with the patient/caregiver.  Any 

specific questions were answered. Patient/caregiver voiced understanding of the 

instructions. Follow-up appointment has been scheduled.


Complications:  none


Patient Tolerated Procedure:  well


Post-Procedure Vital Signs:





Vital Signs








  Date Time  Temp Pulse Resp B/P Pulse Ox O2 Delivery O2 Flow Rate FiO2


 


5/26/17 08:53  92 18 151/86 95 Room Air  


 


5/26/17 07:13 36.8       








Discharge Instructions:  reviewed & understood


Additional Comments:


If otherwise stable, patient can be discharged today and has a follow-up 

appointment scheduled at [facility] pain clinic in approximately 2 weeks.

## 2017-05-27 VITALS — DIASTOLIC BLOOD PRESSURE: 82 MMHG | OXYGEN SATURATION: 96 % | HEART RATE: 88 BPM | SYSTOLIC BLOOD PRESSURE: 148 MMHG

## 2017-05-27 VITALS
OXYGEN SATURATION: 90 % | HEART RATE: 83 BPM | TEMPERATURE: 98.24 F | DIASTOLIC BLOOD PRESSURE: 82 MMHG | SYSTOLIC BLOOD PRESSURE: 125 MMHG

## 2017-05-27 VITALS
SYSTOLIC BLOOD PRESSURE: 148 MMHG | DIASTOLIC BLOOD PRESSURE: 82 MMHG | HEART RATE: 88 BPM | TEMPERATURE: 98.24 F | OXYGEN SATURATION: 96 %

## 2017-05-27 VITALS — OXYGEN SATURATION: 94 %

## 2017-05-27 RX ADMIN — GABAPENTIN SCH MG: 300 CAPSULE ORAL at 08:11

## 2017-05-27 RX ADMIN — ALBUTEROL SULFATE SCH PUFFS: 90 AEROSOL, METERED RESPIRATORY (INHALATION) at 12:56

## 2017-05-27 RX ADMIN — ALBUTEROL SULFATE SCH PUFFS: 90 AEROSOL, METERED RESPIRATORY (INHALATION) at 05:31

## 2017-05-27 RX ADMIN — PANTOPRAZOLE SCH MG: 40 TABLET, DELAYED RELEASE ORAL at 08:12

## 2017-05-27 RX ADMIN — HYDROCODONE BITARTRATE AND ACETAMINOPHEN PRN TAB: 10; 325 TABLET ORAL at 05:32

## 2017-05-27 RX ADMIN — GABAPENTIN SCH MG: 300 CAPSULE ORAL at 14:33

## 2017-05-27 NOTE — DISCHARGE INSTRUCTIONS
Discharge Instructions


Date of Service


May 27, 2017.





Admission


Reason for Admission:  Back Pain due to Herniated disc at L4/5





Discharge


Discharge Diagnosis / Problem:  Back pain due to herniated disc - slowly 

improving.





Discharge Goals


Goal(s):  Decrease discomfort, Improve function, Increase independence, Improve 

disease control, Learn about illness, Diagnostic testing, Therapeutic 

intervention





Activity Recommendations


Activity Limitations:  as noted below


Lifting Limitations:  no more than 10 pounds


Shower/Bathe:  no limitations


Driving or Machine Use:  please do not drive if you are taking narcotic pain 

pills


Avoid any activity that makes your back and right leg feel worse.


Avoid excessive yard work, chores around your home, etc.  


.





Instructions / Follow-Up


Instructions / Follow-Up





From Dr. Weston -





1.  Please take the following for your back - 


* norco (hydrocodone/tylenol combination pill) every 4 hours as needed for pain


* dexamethasone taper over 3 days; begin this TONIGHT


 * take 1 tablet TONIGHT


 * then take 1 tablet daily for 2 days (Sunday/Monday)


* gabapentin 300mg three times a day 





DO NOT take any over-the-counter anti-inflammatory pills (motrin, aleve, aspirin

, etc). 


DO NOT take any over-the-counter tylenol as your pain pill has tylenol in it.


DO NOT drink alcohol with prescription pain pills. 


DO NOT drive a vehicle while taking prescription pain pills.  





2.  For constipation -


* take miralax twice a day


* take senna once a day


* both can be purchased over-the-counter


* your constipation WILL BE WORSE due to the narcotic pain pills 





3.  While on steroids please take protonix (pantoprazole) once daily for 10 

days.  





4.  Follow-up appointments -


* see Dr. Duncan THIS COMING WEEK to recheck your back and obtain additional 

medication, if needed


* see Dr. Mike at the pain management clinic as scheduled in June 2017 





5.  Return to [facility] if - 


* your pain is no longer being controlled with the medications


* you have difficulty passing your stool or urine 


* you have difficulty walking


* fever over 100.5 degrees








Over the next few days the steroid from the injection should start to take 

effect and give you further pain relief.





Current Hospital Diet


Patient's current hospital diet: Regular Diet





Discharge Diet


Recommended Diet:  Regular Diet





Procedures


Procedures Performed:  


L4-L5 epidural steroid injection - Dr. Thang Mike





Pending Studies


Studies pending at discharge:  no





Medical Emergencies








.


Who to Call and When:





Medical Emergencies:  If at any time you feel your situation is an emergency, 

please call 911 immediately.





.





Non-Emergent Contact


Non-Emergency issues call your:  Primary Care Provider


Call Non-Emergent contact if:  your pain is not controlled, your pain is 

worsening, your pain is concerning you, you have any medication questions





.


.





"Provider Documentation" section prepared by Konstantin Weston.








.





VTE Core Measure


Inpt VTE Proph given/why not?:  Unfractionated heparin SQ

## 2017-05-29 NOTE — DISCHARGE SUMMARY
Discharge Summary


Date of Service


May 29, 2017.





Discharge Summary


Admission Date:


May 24, 2017 at 23:28


Discharge Date:  May 27, 2017


Discharge Disposition:  Home


Principal Diagnosis:  L4-L5 herniated disc with resulting right-sided 

radiculopathy


Problems/Secondary Diagnoses:


1.  bipolar disorder type 1 


2.  tobacco dependence


3.  COPD 


4.  BPH 


5.  h/o past suicide attempt


Immunizations:  


   Have You Had Influenza Vaccine:  Unknown


   History of Tetanus Vaccine?:  Yes


   Tetanus Immunization Date:  Jan 4, 2007


   History of Pneumococcal:  No


   History of Hepatitis B Vaccine:  No


Procedures:


L4-L5 epidural steroid injection - Thang Mike MD


Consultations:


1.  pain management - Thang Mike MD


2.  orthopedics - DIOGO Pratt / Zoltan Manuel DO 


3.  PT, OT 


4.  psychiatry - Stephy Pro MD





Medication Reconciliation


New Medications:  


Dexamethasone (Decadron) 4 Mg Tab


4 TAB PO AS DIRECTED, #3 TAB


1 tab PO x 1 on the evening of 5/27, then 1 tab daily x 2 days, then


 stop.  Take with food.


Gabapentin (Gabapentin) 300 Mg Cap


300 MG PO TID, #60 CAP 0 Refills





Hydrocodone/Acetaminophen 10MG/325MG (Norco 10MG/325MG)  Tab


1 TAB PO Q4H PRN for Pain, #30 TAB 0 Refills


PRN PAIN


Pantoprazole (Pantoprazole Sodium) 40 Mg Tab


40 MG PO QAM for 10 Days, #10 TAB 0 Refills





Polyethylene (Miralax) 17 Gm Pow


17 GM PO BID, #60 PKT 2 Refills





Senna (Senna Lax) 8.6 Mg Tab


17.2 MG PO QAM, #60 TAB 2 Refills





 


Continued Medications:  


Albuterol Hfa (Ventolin Hfa) 200 Puffs/47228 Mcg Aers


2-4 PUFFS INH Q6H PRN for SOB/Wheezing, #1 INHALER





Finasteride (Proscar) 5 Mg Tab


5 MG PO QPM, 0 Refills





Quetiapine Fumarate (Quetiapine Fumarate) 400 Mg Tab


800 MG PO HS, #60











Referrals At Discharge


Follow up Referrals:  


Physician Referral - Please Call For Appointment with Shadia Duncan DO








Discharge Exam


Physical Exam:  


   General Appearance:  no apparent distress


   ENT:  pharynx normal


   Neck:  no JVD


   Respiratory/Chest:  no respiratory distress, no accessory muscle use, + 

wheezing


   Cardiovascular:  regular rate, rhythm, no gallop, no murmur, normal 

peripheral pulses


   Abdomen / GI:  normal bowel sounds, non tender, soft, no organomegaly


   Extremities:  no pedal edema


   Neurologic/Psychiatric:  no motor/sensory deficits, alert, normal reflexes, 

oriented x 3


   Skin:  no rash





Hospital Course


HISTORY OF PRESENT ILLNESS:


60yo male with h/o COPD, Bipolar disorder, and tobacco dependence who presented 

with severe low back pain and right leg pain for several weeks.  He had visited 

his orthopedist and was scheduled for an appointment following an MRI.  

Unfortunately he failed to attend the follow-up appointment.  The MRI completed 

on 05/11/17 revealed a significant L4-5 disc herniation with resulting right-

sided nerve impingement.  He described severe persistent pain, RLE numbness and 

weakness and difficulty ambulating as a result.  


The patient threatened self harm if he was discharged from the ER, stating to 

the ER attending that he would take all his Seroquel if he was sent home.  In 

retrospect he stated that he was not literally wanting to hurt himself but 

simply wished to make clear that he could not tolerate the degree of pain he 

was having.  The case was discussed with orthopedics who advised on admission 

for pain control and AM evaluation.








HOSPITAL COURSE: 


The patient was treated with a combination of IV steroids, IV/PO narcotics, and 

gabapentin for his L4-L5 herniated disc with right leg radiculopathy.


He was seen in consult by both orthopedics and pain management.  


A trial of epidural steroid injection was strongly recommended and he underwent 

such on 5/26/17 by Dr. Thang Mike without incident.  





The patient's pain improved during his stay.  He was seen in consult by PT/OT 

who cleared him for discharge home.  


He was ambulating well; in fact he ambulated 500+ feet with PT on day of 

discharge.  





During this stay the patient exhibited a tendency towards explosive and 

aggressive behavior.  


He was seen in consult by psychiatry.  No medication changes were made.  He 

will remain on seroquel at .  


He did NOT voice suicidal ideation or intent to the psychiatry team.  


He was encouraged to have regular psychiatric follow-up in the outpatient 

clinic.  





At discharge the following were given/recommended -


1.  prescription for limited amount of norco 10's


2.  gabapentin 300mg TID 


3.  3-day taper of oral decadron (while awaiting epidural injection to take 

effect)


4.  bowel regimen of miralax + senna 





He has scheduled follow-up with Dr. Mike in the pain management clinic in mid 

June.


Total Time Spent:  Greater than 30 minutes


This includes examination of the patient, discharge planning, medication 

reconciliation, and communication with other providers.





Discharge Instructions


Please refer to the electronic Patient Visit Report (Discharge Instructions) 

for additional information.





Follow-Up


1.  see Shadia Duncan DO - within 5 days - for follow-up of back pain


2.  see Thang Mike MD - pain management - 6/14/2017 @ 9:20 A.M.





Additional Copies To


Angela Brito; Shadia Duncan DO; Carisa Mike M.D.

## 2018-07-31 ENCOUNTER — HOSPITAL ENCOUNTER (OUTPATIENT)
Dept: HOSPITAL 45 - C.LABPBG | Age: 63
Discharge: HOME | End: 2018-07-31
Attending: FAMILY MEDICINE
Payer: COMMERCIAL

## 2018-07-31 DIAGNOSIS — C67.9: ICD-10-CM

## 2018-07-31 DIAGNOSIS — N40.0: Primary | ICD-10-CM

## 2018-07-31 DIAGNOSIS — E78.5: ICD-10-CM

## 2018-07-31 LAB
BASOPHILS # BLD: 0.02 K/UL (ref 0–0.2)
BASOPHILS NFR BLD: 0.3 %
BUN SERPL-MCNC: 10 MG/DL (ref 7–18)
CALCIUM SERPL-MCNC: 8.9 MG/DL (ref 8.5–10.1)
CO2 SERPL-SCNC: 26 MMOL/L (ref 21–32)
CREAT SERPL-MCNC: 1.06 MG/DL (ref 0.6–1.4)
EOS ABS #: 0.14 K/UL (ref 0–0.5)
EOSINOPHIL NFR BLD AUTO: 210 K/UL (ref 130–400)
GLUCOSE SERPL-MCNC: 83 MG/DL (ref 70–99)
HCT VFR BLD CALC: 42.2 % (ref 42–52)
HGB BLD-MCNC: 14.8 G/DL (ref 14–18)
IG#: 0.02 K/UL (ref 0–0.02)
IMM GRANULOCYTES NFR BLD AUTO: 27 %
KETONES UR QL STRIP: 86 MG/DL
LYMPHOCYTES # BLD: 1.93 K/UL (ref 1.2–3.4)
MCH RBC QN AUTO: 32.2 PG (ref 25–34)
MCHC RBC AUTO-ENTMCNC: 35.1 G/DL (ref 32–36)
MCV RBC AUTO: 91.9 FL (ref 80–100)
MONO ABS #: 0.51 K/UL (ref 0.11–0.59)
MONOCYTES NFR BLD: 7.1 %
NEUT ABS #: 4.53 K/UL (ref 1.4–6.5)
NEUTROPHILS # BLD AUTO: 2 %
NEUTROPHILS NFR BLD AUTO: 63.3 %
PH UR: 193 MG/DL (ref 0–200)
PMV BLD AUTO: 9.8 FL (ref 7.4–10.4)
POTASSIUM SERPL-SCNC: 4.1 MMOL/L (ref 3.5–5.1)
RED CELL DISTRIBUTION WIDTH CV: 13.5 % (ref 11.5–14.5)
RED CELL DISTRIBUTION WIDTH SD: 45 FL (ref 36.4–46.3)
SODIUM SERPL-SCNC: 139 MMOL/L (ref 136–145)
WBC # BLD AUTO: 7.15 K/UL (ref 4.8–10.8)